# Patient Record
(demographics unavailable — no encounter records)

---

## 2024-10-08 NOTE — PHYSICAL EXAM
[de-identified] : Physical exam left wrist: Patient is in a short arm fiberglass cast is fitting him comfortably.  He has full range of motion of the elbow and the fingers with no pain.  Less than 2 seconds capillary refill.

## 2024-10-08 NOTE — DISCUSSION/SUMMARY
[de-identified] : Patient will be kept in the short arm cast for another 2 weeks.  He will come back in 2 weeks for cast off and x-rays.  Continue to stay out of any contact physical activities until then.  Motrin as needed.

## 2024-10-08 NOTE — HISTORY OF PRESENT ILLNESS
[de-identified] : 12-year-old male, accompanied by mother, presents for left wrist follow-up.  Patient has been in a short arm cast.  No acute complaints today.  Patient has been playing soccer with the cast on but only noncontact practice and no games.

## 2024-10-24 NOTE — PHYSICAL EXAM
[de-identified] : Physical exam left wrist: No erythema, ecchymosis, edema.  No tenderness palpation of wrist.  Full range of motion of wrist with no pain

## 2024-10-24 NOTE — DISCUSSION/SUMMARY
[de-identified] : At this time I transition patient to a cock up wrist brace to wear at all times can take off for hygiene purposes.  Still no gym and sports for another 2 weeks.  Follow-up in 2 weeks at that time I will clear her for gym and sports.

## 2024-10-24 NOTE — DATA REVIEWED
[FreeTextEntry1] :  x-ray images were obtained at the office today.  AP, lateral, oblique views left wrist reveal callus formation of the distal radius

## 2024-10-24 NOTE — HISTORY OF PRESENT ILLNESS
[de-identified] : 12-year-old male, accompanied by mother, presents for left wrist follow-up.  Patient is here for cast off today.

## 2024-11-07 NOTE — HISTORY OF PRESENT ILLNESS
[de-identified] : 12-year-old male, accompanied by mother, presents for left wrist follow-up.  Patient is here for range of motion check and clearance to return back to sports

## 2024-11-07 NOTE — PHYSICAL EXAM
[de-identified] : Physical exam left wrist: No erythema, ecchymosis, edema.  No tenderness to palpation of the wrist.  Patient has full range of motion of the wrist with no pain.

## 2024-11-07 NOTE — DISCUSSION/SUMMARY
[de-identified] : At this time, patient is cleared to return back to gym and sports with no restrictions.  Patient will follow-up if there is any concerns or reinjury.  Patient and mother agree to above plan all questions were answered today

## 2025-06-24 NOTE — HISTORY OF PRESENT ILLNESS
[FreeTextEntry1] : 11 y/o male here with proximal phalanx of right little finger fracture after jamming the finger while playing basketball on 6/8. Patient was last seen by FAUSTO Casillas on 6/10 and was placed in an [employer]uma[employer]am splint.

## 2025-07-03 NOTE — ASSESSMENT
[FreeTextEntry1] : 12-year-old male here accompanied by his mother presents 1 month status post a right pinky finger injury.  He is doing well overall.  His pain is well-controlled.  Physical examination the right pinky finger: Mild swelling appreciated at the PIP joint and proximal phalanx.  Mildly tender to palpation at the PIP joint and distal aspect of the proximal phalanx.  Can fully flex and extend all joints.  No deformities.  No malrotation or deviation.  Sensation intact distally.  Neuro vas intact.  X-rays of right pinky finger taken in the office today reveal an acute closed nondisplaced fracture through the distal aspect of the proximal phalanx of the right pinky finger.  No subluxations or dislocations.  Routine healing is appreciated.  TX: X-rays were discussed in depth.  Fracture was discussed in depth.  Prognosis was discussed in depth.  Patient can return to normal activity within the limitations of pain.  Encouraged very gentle range of motion active modification within his limitations of pain.  Red flag symptoms were discussed. All questions and concerns addressed to patient's satisfaction. Patient expresses full understanding of treatment plan.